# Patient Record
Sex: FEMALE | Race: WHITE | NOT HISPANIC OR LATINO | Employment: UNEMPLOYED | URBAN - METROPOLITAN AREA
[De-identification: names, ages, dates, MRNs, and addresses within clinical notes are randomized per-mention and may not be internally consistent; named-entity substitution may affect disease eponyms.]

---

## 2021-06-03 ENCOUNTER — HOSPITAL ENCOUNTER (EMERGENCY)
Facility: CLINIC | Age: 31
Discharge: HOME | End: 2021-06-03
Attending: EMERGENCY MEDICINE | Admitting: EMERGENCY MEDICINE

## 2021-06-03 VITALS
DIASTOLIC BLOOD PRESSURE: 81 MMHG | HEART RATE: 69 BPM | RESPIRATION RATE: 16 BRPM | OXYGEN SATURATION: 100 % | SYSTOLIC BLOOD PRESSURE: 120 MMHG | TEMPERATURE: 97.5 F | WEIGHT: 162 LBS

## 2021-06-03 DIAGNOSIS — F33.1 MODERATE EPISODE OF RECURRENT MAJOR DEPRESSIVE DISORDER (H): ICD-10-CM

## 2021-06-03 PROCEDURE — 99284 EMERGENCY DEPT VISIT MOD MDM: CPT | Performed by: EMERGENCY MEDICINE

## 2021-06-03 PROCEDURE — 99285 EMERGENCY DEPT VISIT HI MDM: CPT | Mod: 25 | Performed by: EMERGENCY MEDICINE

## 2021-06-03 PROCEDURE — 90791 PSYCH DIAGNOSTIC EVALUATION: CPT

## 2021-06-03 RX ORDER — HYDROXYZINE HYDROCHLORIDE 25 MG/1
50 TABLET, FILM COATED ORAL PRN
COMMUNITY

## 2021-06-03 ASSESSMENT — ENCOUNTER SYMPTOMS: DYSPHORIC MOOD: 1

## 2021-06-03 NOTE — ED NOTES
Safety search performed by writer now.  Pt willingly gave up purse shaped a backpack and her shoes. Kept her cell phone on her. Items secured in locker, shoes in patient bag.

## 2021-06-03 NOTE — ED NOTES
"Patient arrives with her partner states she is in between housing. States she was living with roommates and her boyfriend.  Pt denies previous inpatient or SI attempt but has had thoughts with \"options\" to end her life.  Pt in HW and does not go into further detail on her SI options with RN.  Denies any SIB in the last 72 hours or hx of.  "

## 2021-06-04 NOTE — DISCHARGE INSTRUCTIONS
Continue working with your current individual therapist.  Discuss increasing to weekly therapy.     Continue taking your current medications.  You can take atarax 25 mg twice during the day if needed to help you feel calm.     Medication management appointment was scheduled for June 10th.     Stay with someone at all times for support and helping you stay safe.     Please return if you feel you cannot keep yourself safe/worsening.

## 2021-06-04 NOTE — ED PROVIDER NOTES
[facility] EMERGENCY DEPARTMENT ([facility])   Nu 3, 2021 BEC 3    History     Chief Complaint   Patient presents with     Suicidal     Worsening SI in the last month, no plan     The history is provided by the patient.     Jil Gifford is a 31 year old female with history of depression who presents with depression and worsening suicidal ideation for the past month without plan.  She states that she has had depression all her life, came on in waves ever since she was a teenager.  Her depression has been worsening over the past 2 years.  She states that things seem to just finally catch up to her and over the past few months her depression has been worsening further to the point where she has suicidal ideation.  She denies any specific thoughts or plans, just rumination.  She notes reading about borderline personality disorder and has concerned she has these traits.  Today she was at work feeling hopeless, upset, depressed and got scared enough to seek a therapist in the attention of friends, who brought her here for further evaluation.  She notes prior self-injurious behavior but no recent self-injurious behavior over the past few months.  No prior suicide attempts.  No homicidal ideation.  She sees a virtual therapist every 2-3 weeks.  She has a medication prescriber through nurse practitioner but has not checked in with them for a whole year.  She thinks that may be a new medication manager would benefit her.  She states that her Celexa helped her at first but the effect seems to have tapered off.  She denies any recent medication changes.  She does not have insurance.  She again denies any suicidal plan or intent at this time.  No prior psychiatric admissions.  No other psychiatric diagnoses.  She currently lives with friends, is planning on moving in with her boyfriend this weekend. Social drinker.  No substance use.       PAST MEDICAL HISTORY: No past medical history on file.   PAST SURGICAL HISTORY:  No past surgical history on file.    Past medical history, past surgical history, medications, and allergies were reviewed with the patient. Additional pertinent items: None    FAMILY HISTORY: No family history on file.    SOCIAL HISTORY:   Social History     Tobacco Use     Smoking status: Not on file   Substance Use Topics     Alcohol use: Not on file     Social history was reviewed with the patient. Additional pertinent items: None      Discharge Medication List as of 6/3/2021 10:23 PM      CONTINUE these medications which have NOT CHANGED    Details   CITALOPRAM HYDROBROMIDE PO Take by mouth daily, Historical      hydrOXYzine (ATARAX) 25 MG tablet Take 50 mg by mouth as needed for itching, Historical              No Known Allergies     Review of Systems   Psychiatric/Behavioral: Positive for dysphoric mood and suicidal ideas. Negative for hallucinations and self-injury. The patient is not hyperactive.    All other systems reviewed and are negative.    A complete review of systems was performed with pertinent positives and negatives noted in the HPI, and all other systems negative.    Physical Exam   BP: 109/66  Pulse: 69  Temp: 97.5  F (36.4  C)  Resp: 16  Weight: 73.5 kg (162 lb)  SpO2: 99 %      Physical Exam  Vitals signs and nursing note reviewed.   HENT:      Head: Normocephalic and atraumatic.      Nose: Nose normal.   Eyes:      Extraocular Movements: Extraocular movements intact.   Neck:      Musculoskeletal: Normal range of motion.   Cardiovascular:      Rate and Rhythm: Normal rate.   Pulmonary:      Effort: Pulmonary effort is normal.   Musculoskeletal: Normal range of motion.   Skin:     Coloration: Skin is not jaundiced or pale.   Neurological:      General: No focal deficit present.      Mental Status: She is alert and oriented to person, place, and time.   Psychiatric:         Attention and Perception: Attention and perception normal.         Mood and Affect: Mood is depressed.         Speech:  Speech normal.         Behavior: Behavior normal. Behavior is cooperative.         Thought Content: Thought content is not paranoid or delusional. Thought content includes suicidal ideation. Thought content does not include homicidal ideation. Thought content does not include homicidal or suicidal plan.         Cognition and Memory: Cognition and memory normal.         Judgment: Judgment normal.         ED Course        Procedures                 No results found for this or any previous visit (from the past 24 hour(s)).  Medications - No data to display          Assessments & Plan (with Medical Decision Making)   The patient presents to the ED due to worsening depression and passive si.  She denies plan or intent.  She was seen by myself and the DEC .  We did not feel that she was an imminent safety risk and could be discharged home with outpatient follow up.  She has a therapist and will continue to work with them.  She sees them every few weeks due to paying out of pocket, but we encouraged her to go weekly.  She is planning to work on getting insurance established.  She will continue her current antidepressant.  She was also given atarax during the day prn.  A medication management appointment was scheduled for June 10th.  She will stay will supportive friends and return if worsening suicidal thoughts.      I have reviewed the nursing notes.    I have reviewed the findings, diagnosis, plan and need for follow up with the patient.    Discharge Medication List as of 6/3/2021 10:23 PM          Final diagnoses:   Moderate episode of recurrent major depressive disorder (H)   ISallie, am serving as a trained medical scribe to document services personally performed by  Yandy Rojas MD based on the provider's statements to me on Nu 3, 2021.  This document has been checked and approved by the attending provider.    IYandy MD, was physically present and have reviewed and verified the accuracy of  this note documented by Sallie Fuentes, medical scribe.       6/3/2021   [facility] EMERGENCY DEPARTMENT     Yandy Rojas MD  06/05/21 0553

## 2021-06-05 ASSESSMENT — ENCOUNTER SYMPTOMS
HALLUCINATIONS: 0
HYPERACTIVE: 0